# Patient Record
Sex: FEMALE | Race: WHITE | NOT HISPANIC OR LATINO | Employment: PART TIME | URBAN - METROPOLITAN AREA
[De-identification: names, ages, dates, MRNs, and addresses within clinical notes are randomized per-mention and may not be internally consistent; named-entity substitution may affect disease eponyms.]

---

## 2024-05-29 ENCOUNTER — VBI (OUTPATIENT)
Dept: ADMINISTRATIVE | Facility: OTHER | Age: 29
End: 2024-05-29

## 2024-08-25 ENCOUNTER — OFFICE VISIT (OUTPATIENT)
Dept: URGENT CARE | Facility: MEDICAL CENTER | Age: 29
End: 2024-08-25
Payer: COMMERCIAL

## 2024-08-25 VITALS
DIASTOLIC BLOOD PRESSURE: 68 MMHG | WEIGHT: 206 LBS | OXYGEN SATURATION: 99 % | HEART RATE: 80 BPM | TEMPERATURE: 97.4 F | BODY MASS INDEX: 32.26 KG/M2 | RESPIRATION RATE: 20 BRPM | SYSTOLIC BLOOD PRESSURE: 104 MMHG

## 2024-08-25 DIAGNOSIS — L73.1 INGROWN HAIR: Primary | ICD-10-CM

## 2024-08-25 PROCEDURE — 99213 OFFICE O/P EST LOW 20 MIN: CPT | Performed by: FAMILY MEDICINE

## 2024-08-25 PROCEDURE — S9088 SERVICES PROVIDED IN URGENT: HCPCS | Performed by: FAMILY MEDICINE

## 2024-08-25 RX ORDER — CLONIDINE HYDROCHLORIDE 0.1 MG/1
0.1 TABLET ORAL EVERY EVENING
COMMUNITY
Start: 2024-08-15

## 2024-08-25 RX ORDER — SULFAMETHOXAZOLE/TRIMETHOPRIM 800-160 MG
1 TABLET ORAL EVERY 12 HOURS SCHEDULED
Qty: 14 TABLET | Refills: 0 | Status: SHIPPED | OUTPATIENT
Start: 2024-08-25 | End: 2024-09-01

## 2024-08-25 NOTE — PATIENT INSTRUCTIONS
Continue using the black salve.  Monitor for increased area of redness, warmth, and a regular streak moving up the leg.  Ibuprofen for fever and pain.    Follow up with PCP in 3-5 days.  Proceed to  ER if symptoms worsen.    If tests have been performed at [facility], our office will contact you with results if changes need to be made to the care plan discussed with you at the visit.  You can review your full results on [facility] MyChart.

## 2024-11-29 ENCOUNTER — APPOINTMENT (OUTPATIENT)
Dept: RADIOLOGY | Facility: MEDICAL CENTER | Age: 29
End: 2024-11-29
Payer: COMMERCIAL

## 2024-11-29 ENCOUNTER — OFFICE VISIT (OUTPATIENT)
Dept: URGENT CARE | Facility: MEDICAL CENTER | Age: 29
End: 2024-11-29
Payer: COMMERCIAL

## 2024-11-29 VITALS
RESPIRATION RATE: 18 BRPM | TEMPERATURE: 98.1 F | HEART RATE: 121 BPM | OXYGEN SATURATION: 99 % | WEIGHT: 170 LBS | DIASTOLIC BLOOD PRESSURE: 80 MMHG | SYSTOLIC BLOOD PRESSURE: 140 MMHG | BODY MASS INDEX: 26.68 KG/M2 | HEIGHT: 67 IN

## 2024-11-29 DIAGNOSIS — S49.91XA RIGHT SHOULDER INJURY, INITIAL ENCOUNTER: ICD-10-CM

## 2024-11-29 DIAGNOSIS — S46.911A RIGHT SHOULDER STRAIN, INITIAL ENCOUNTER: Primary | ICD-10-CM

## 2024-11-29 PROCEDURE — 99213 OFFICE O/P EST LOW 20 MIN: CPT | Performed by: PHYSICIAN ASSISTANT

## 2024-11-29 PROCEDURE — 73030 X-RAY EXAM OF SHOULDER: CPT

## 2024-11-29 PROCEDURE — S9088 SERVICES PROVIDED IN URGENT: HCPCS | Performed by: PHYSICIAN ASSISTANT

## 2024-11-30 ENCOUNTER — RESULTS FOLLOW-UP (OUTPATIENT)
Dept: URGENT CARE | Facility: MEDICAL CENTER | Age: 29
End: 2024-11-30

## 2024-11-30 NOTE — PATIENT INSTRUCTIONS
Apply ice several time daily  Take Tylenol/Ibuprofen as needed for pain  Gentle range of motion exercises  If symptoms fail to improve follow up with orthopedics    If tests have been performed at [facility], our office will contact you with results if changes need to be made to the care plan discussed with you at the visit.  You can review your full results on [facility] MyChart

## 2024-11-30 NOTE — PROGRESS NOTES
[facility]        NAME: Ainsley Orr is a 29 y.o. female  : 1995    MRN: [id number]  DATE: 2024  TIME: 7:21 PM    Assessment and Plan   Right shoulder strain, initial encounter [S46.911A]  1. Right shoulder strain, initial encounter  XR shoulder 2+ vw right            Patient Instructions     Apply ice several time daily  Take Tylenol/Ibuprofen as needed for pain  Gentle range of motion exercises  If symptoms fail to improve follow up with orthopedics    Follow up with PCP in 3-5 days.  Proceed to  ER if symptoms worsen.    If tests have been performed at [facility], our office will contact you with results if changes need to be made to the care plan discussed with you at the visit.  [facility]    Chief Complaint     Chief Complaint   Patient presents with    Shoulder Pain     Fell down back porch steps a couple of hours ago and hurt right shoulder          History of Present Illness       Shoulder Pain   The pain is present in the right shoulder. This is a new problem. The current episode started today. There has been a history of trauma (fell down steps). The problem occurs constantly. The problem has been gradually worsening. Quality: pulling. The pain is at a severity of 7/10. The pain is moderate. Associated symptoms include a limited range of motion. Pertinent negatives include no joint swelling, numbness or tingling. The symptoms are aggravated by activity. She has tried nothing for the symptoms.       Review of Systems   Review of Systems   Neurological:  Negative for tingling and numbness.         Current Medications       Current Outpatient Medications:     ARIPiprazole (ABILIFY) 15 mg tablet, Take 15 mg by mouth daily, Disp: , Rfl:     cloNIDine (CATAPRES) 0.1 mg tablet, Take 0.1 mg by mouth every evening, Disp: , Rfl:     escitalopram (LEXAPRO) 10 mg tablet, Take 10 mg by mouth daily, Disp: , Rfl:     Current Allergies  "    Allergies as of 11/29/2024    (No Known Allergies)            The following portions of the patient's history were reviewed and updated as appropriate: allergies, current medications, past family history, past medical history, past social history, past surgical history and problem list.     Past Medical History:   Diagnosis Date    Anxiety        Past Surgical History:   Procedure Laterality Date    TOOTH EXTRACTION         History reviewed. No pertinent family history.      Medications have been verified.        Objective   /80   Pulse (!) 121   Temp 98.1 °F (36.7 °C)   Resp 18   Ht 5' 7\" (1.702 m)   Wt 77.1 kg (170 lb)   SpO2 99%   BMI 26.63 kg/m²   No LMP recorded. Patient has had an implant.       Physical Exam     Physical Exam  Vitals and nursing note reviewed.   Constitutional:       Appearance: Normal appearance.   HENT:      Head: Normocephalic and atraumatic.   Cardiovascular:      Rate and Rhythm: Normal rate.   Pulmonary:      Effort: Pulmonary effort is normal.   Musculoskeletal:      Comments: Right shoulder without swelling. Tenderness over mid clavicle and AC join. Diminished ROM   Skin:     General: Skin is warm.   Neurological:      Mental Status: She is alert.     Xray independently reviewed by me contemporaneously as no acute osseous abnormality or acute process. Awaiting radiology interpretation.                "

## 2024-12-02 ENCOUNTER — OFFICE VISIT (OUTPATIENT)
Dept: URGENT CARE | Facility: CLINIC | Age: 29
End: 2024-12-02
Payer: COMMERCIAL

## 2024-12-02 VITALS
SYSTOLIC BLOOD PRESSURE: 118 MMHG | TEMPERATURE: 99.3 F | DIASTOLIC BLOOD PRESSURE: 82 MMHG | OXYGEN SATURATION: 99 % | HEART RATE: 120 BPM | RESPIRATION RATE: 18 BRPM

## 2024-12-02 DIAGNOSIS — H66.002 NON-RECURRENT ACUTE SUPPURATIVE OTITIS MEDIA OF LEFT EAR WITHOUT SPONTANEOUS RUPTURE OF TYMPANIC MEMBRANE: Primary | ICD-10-CM

## 2024-12-02 PROCEDURE — S9088 SERVICES PROVIDED IN URGENT: HCPCS | Performed by: FAMILY MEDICINE

## 2024-12-02 PROCEDURE — 99213 OFFICE O/P EST LOW 20 MIN: CPT | Performed by: FAMILY MEDICINE

## 2024-12-02 RX ORDER — AMOXICILLIN 875 MG/1
875 TABLET, COATED ORAL 2 TIMES DAILY
Qty: 14 TABLET | Refills: 0 | Status: SHIPPED | OUTPATIENT
Start: 2024-12-02 | End: 2024-12-09

## 2024-12-02 NOTE — PROGRESS NOTES
[facility]        NAME: Ainsley Orr is a 29 y.o. female  : 1995    MRN: [id number]  DATE: 2024  TIME: 4:58 PM    Assessment and Plan   Non-recurrent acute suppurative otitis media of left ear without spontaneous rupture of tympanic membrane [H66.002]  1. Non-recurrent acute suppurative otitis media of left ear without spontaneous rupture of tympanic membrane  amoxicillin (AMOXIL) 875 mg tablet        Otitis media of the left ear will treat with amoxicillin.    Patient Instructions   You have been prescribed an antibiotic.  Take antibiotic as directed for the full duration.  Do not stop the antibiotics just because you are feeling better.  Side effects of antibiotics include diarrhea.  Eat yogurt or take a probiotic or acidophilus tablet while taking this medication to help prevent diarrhea and replenish good gut bacteria.    Follow up with PCP in 3-5 days.  Proceed to  ER if symptoms worsen.    Chief Complaint     Chief Complaint   Patient presents with    Earache     Left ear pain started today, feeling sick for about a week with cough and  congestion, sob         History of Present Illness       Patient is a 29 y.o female who presents to the office today for left ear pain. Has been sick URI symptoms for over a week. Now started with pain in the left ear and feverish last night. Cough is now wet sounding. Denies tinnitus.     Earache   Associated symptoms include coughing.       Review of Systems   Review of Systems   Constitutional:  Negative for fever.   HENT:  Positive for congestion and ear pain.    Respiratory:  Positive for cough.    All other systems reviewed and are negative.        Current Medications       Current Outpatient Medications:     amoxicillin (AMOXIL) 875 mg tablet, Take 1 tablet (875 mg total) by mouth 2 (two) times a day for 7 days, Disp: 14 tablet, Rfl: 0    ARIPiprazole (ABILIFY) 15 mg tablet, Take 15 mg by mouth daily, Disp: , Rfl:     cloNIDine (CATAPRES)  0.1 mg tablet, Take 0.1 mg by mouth every evening, Disp: , Rfl:     escitalopram (LEXAPRO) 10 mg tablet, Take 10 mg by mouth daily, Disp: , Rfl:     Current Allergies     Allergies as of 12/02/2024    (No Known Allergies)            The following portions of the patient's history were reviewed and updated as appropriate: allergies, current medications, past family history, past medical history, past social history, past surgical history and problem list.     Past Medical History:   Diagnosis Date    Anxiety        Past Surgical History:   Procedure Laterality Date    TOOTH EXTRACTION         History reviewed. No pertinent family history.      Medications have been verified.        Objective   /82   Pulse (!) 120   Temp 99.3 °F (37.4 °C)   Resp 18   LMP 11/25/2024 (Approximate)   SpO2 99%        Physical Exam     Physical Exam  Vitals and nursing note reviewed.   Constitutional:       General: She is not in acute distress.     Appearance: Normal appearance. She is not ill-appearing or toxic-appearing.   HENT:      Right Ear: A middle ear effusion is present. Tympanic membrane is erythematous and bulging.      Left Ear: A middle ear effusion is present. Tympanic membrane is bulging. Tympanic membrane is not erythematous.      Nose: No congestion.      Mouth/Throat:      Mouth: Mucous membranes are moist.   Cardiovascular:      Rate and Rhythm: Tachycardia present.      Pulses: Normal pulses.      Heart sounds: Normal heart sounds.   Pulmonary:      Effort: Pulmonary effort is normal.      Breath sounds: Normal breath sounds.   Skin:     General: Skin is warm.      Capillary Refill: Capillary refill takes less than 2 seconds.   Neurological:      Mental Status: She is alert.

## 2024-12-02 NOTE — LETTER
December 2, 2024     Patient: Ainsley Orr   YOB: 1995   Date of Visit: 12/2/2024       To Whom It May Concern:    It is my medical opinion that Ainsley Orr may return to work on 12/3/2024 .    If you have any questions or concerns, please don't hesitate to call.         Sincerely,        FRANCES Posey    CC: No Recipients

## 2024-12-27 ENCOUNTER — APPOINTMENT (EMERGENCY)
Dept: RADIOLOGY | Facility: HOSPITAL | Age: 29
End: 2024-12-27
Payer: COMMERCIAL

## 2024-12-27 ENCOUNTER — HOSPITAL ENCOUNTER (EMERGENCY)
Facility: HOSPITAL | Age: 29
Discharge: HOME | End: 2024-12-27
Payer: COMMERCIAL

## 2024-12-27 VITALS
HEART RATE: 56 BPM | OXYGEN SATURATION: 96 % | RESPIRATION RATE: 18 BRPM | WEIGHT: 162 LBS | DIASTOLIC BLOOD PRESSURE: 76 MMHG | BODY MASS INDEX: 25.37 KG/M2 | TEMPERATURE: 98.9 F | SYSTOLIC BLOOD PRESSURE: 110 MMHG

## 2024-12-27 DIAGNOSIS — R68.89 FLU-LIKE SYMPTOMS: Primary | ICD-10-CM

## 2024-12-27 LAB
FLUAV AG UPPER RESP QL IA.RAPID: NEGATIVE
FLUBV AG UPPER RESP QL IA.RAPID: NEGATIVE
SARS-COV+SARS-COV-2 AG RESP QL IA.RAPID: NEGATIVE

## 2024-12-27 PROCEDURE — NC001 PR NO CHARGE

## 2024-12-27 PROCEDURE — 87804 INFLUENZA ASSAY W/OPTIC: CPT

## 2024-12-27 PROCEDURE — 71046 X-RAY EXAM CHEST 2 VIEWS: CPT

## 2024-12-27 PROCEDURE — 87811 SARS-COV-2 COVID19 W/OPTIC: CPT

## 2024-12-27 PROCEDURE — 99284 EMERGENCY DEPT VISIT MOD MDM: CPT

## 2024-12-27 PROCEDURE — 99283 EMERGENCY DEPT VISIT LOW MDM: CPT

## 2024-12-27 RX ORDER — ACETAMINOPHEN 325 MG/1
975 TABLET ORAL ONCE
Status: COMPLETED | OUTPATIENT
Start: 2024-12-27 | End: 2024-12-27

## 2024-12-27 RX ADMIN — ACETAMINOPHEN 975 MG: 325 TABLET, FILM COATED ORAL at 04:30

## 2024-12-27 NOTE — Clinical Note
Ainsley Orr was seen and treated in our emergency department on 12/27/2024.                Diagnosis:     Ainsley  may return to work on return date.    She may return on this date: 12/30/2024         If you have any questions or concerns, please don't hesitate to call.      Pavel Watters MD    ______________________________           _______________          _______________  Hospital Representative                              Date                                Time

## 2024-12-27 NOTE — DISCHARGE INSTRUCTIONS
If you are feeling very short of breath, if you feel like your breathing is very labored, and you have taken Tylenol and ibuprofen without relief, come back for evaluation.  Otherwise anticipate symptoms lasting a few more days before getting better.  The cough itself may last for up to 3 weeks after the other symptoms improved.

## 2024-12-27 NOTE — ED PROVIDER NOTES
Time reflects when diagnosis was documented in both MDM as applicable and the Disposition within this note       Time User Action Codes Description Comment    12/27/2024  5:19 AM Pavel Watters Add [R68.89] Flu-like symptoms           ED Disposition       ED Disposition   Discharge    Condition   Stable    Date/Time   Fri Dec 27, 2024  5:19 AM    Comment   Ainsley Orr discharge to home/self care.                   Assessment & Plan       Medical Decision Making  Medical complexity: 29-year-old female presenting with ongoing cough, body aches, fevers, congestion.  Flulike symptoms that been ongoing for the past several days.  Patient son is now sick with similar symptoms.  Will send viral panel.  Given ongoing cough with productive nature which is worsened over the past few days, will screen chest x-ray for sign of pneumonia, pleural effusion, or pneumothorax.    Reassessment/disposition: Thankfully, patient's chest x-ray was within normal limits without any sign of focal consolidation.  Therefore patient was reassessed and found to have ongoing stable vital signs within normal range without severe symptoms that would warrant further invasive medical testing or workup.  Patient will continue supportive care measures at home, including Tylenol and Motrin as needed for fevers and bodyaches, and p.o. fluids as well as Zofran as needed for nausea or vomiting.  Patient was discharged in no acute distress with normal range vital signs.    Amount and/or Complexity of Data Reviewed  Labs: ordered.  Radiology: ordered and independent interpretation performed.    Risk  OTC drugs.             Medications   acetaminophen (TYLENOL) tablet 975 mg (975 mg Oral Given 12/27/24 0430)       ED Risk Strat Scores                          SBIRT 22yo+      Flowsheet Row Most Recent Value   Initial Alcohol Screen: US AUDIT-C     1. How often do you have a drink containing alcohol? 0 Filed at: 12/27/2024 0338   2. How many drinks  containing alcohol do you have on a typical day you are drinking?  0 Filed at: 12/27/2024 0338   3b. FEMALE Any Age, or MALE 65+: How often do you have 4 or more drinks on one occassion? 0 Filed at: 12/27/2024 0338   Audit-C Score 0 Filed at: 12/27/2024 0338   ANA: How many times in the past year have you...    Used an illegal drug or used a prescription medication for non-medical reasons? Never Filed at: 12/27/2024 0338                            History of Present Illness       Chief Complaint   Patient presents with    Flu Symptoms     Patient reports flu like symptoms since Sunday night, cough runny nose chest congestions generalized aches fever.       Past Medical History:   Diagnosis Date    Anxiety       Past Surgical History:   Procedure Laterality Date    TOOTH EXTRACTION        History reviewed. No pertinent family history.   Social History     Tobacco Use    Smoking status: Former     Current packs/day: 0.25     Types: Cigarettes     Passive exposure: Current   Vaping Use    Vaping status: Never Used   Substance Use Topics    Alcohol use: Never    Drug use: Yes     Types: Marijuana      E-Cigarette/Vaping    E-Cigarette Use Never User       E-Cigarette/Vaping Substances      I have reviewed and agree with the history as documented.     This is a 29-year-old female who is endorsing symptoms of rhinorrhea, chest congestion, cough, generalized body aches, and fevers that have been present since Sunday.  Patient states that her symptoms are actually improving today however have been persistent for the past several days.  Patient is mostly concerned due to the fact that her son is now sick with similar symptoms.  She is denying any significant shortness of breath but is concerned about the productive nature of her cough.  She endorses production of occasional greenish sputum.  She otherwise is denying any chest pain, leg swelling.         Review of Systems   Constitutional:  Positive for appetite change,  chills, fatigue and fever.   HENT:  Positive for congestion. Negative for ear pain and sore throat.    Eyes:  Negative for pain and visual disturbance.   Respiratory:  Positive for cough. Negative for shortness of breath.    Cardiovascular:  Negative for chest pain and palpitations.   Gastrointestinal:  Negative for abdominal pain and vomiting.   Genitourinary:  Negative for dysuria and hematuria.   Musculoskeletal:  Positive for arthralgias and myalgias. Negative for back pain.   Skin:  Negative for color change and rash.   Neurological:  Negative for seizures and syncope.   All other systems reviewed and are negative.          Objective       ED Triage Vitals [12/27/24 0334]   Temperature Pulse Blood Pressure Respirations SpO2 Patient Position - Orthostatic VS   99.2 °F (37.3 °C) (!) 53 114/80 18 95 % --      Temp Source Heart Rate Source BP Location FiO2 (%) Pain Score    Temporal -- -- -- 6      Vitals      Date and Time Temp Pulse SpO2 Resp BP Pain Score FACES Pain Rating User   12/27/24 0537 98.9 °F (37.2 °C) 56 96 % 18 110/76 No Pain --    12/27/24 0430 -- -- -- -- -- Med Not Given for Pain - for MAR use only --    12/27/24 0338 -- -- 95 % -- -- -- --    12/27/24 0334 99.2 °F (37.3 °C) 53 95 % 18 114/80 6 --             Physical Exam  Vitals and nursing note reviewed.   Constitutional:       General: She is not in acute distress.     Appearance: She is well-developed and normal weight.   HENT:      Head: Normocephalic and atraumatic.      Right Ear: External ear normal.      Left Ear: External ear normal.      Nose: Congestion and rhinorrhea present.      Mouth/Throat:      Mouth: Mucous membranes are moist.      Pharynx: Oropharynx is clear. No oropharyngeal exudate or posterior oropharyngeal erythema.   Eyes:      General: No scleral icterus.     Extraocular Movements: Extraocular movements intact.      Conjunctiva/sclera: Conjunctivae normal.      Pupils: Pupils are equal, round, and reactive to  light.   Cardiovascular:      Rate and Rhythm: Normal rate and regular rhythm.      Pulses: Normal pulses.      Heart sounds: Normal heart sounds. No murmur heard.  Pulmonary:      Effort: Pulmonary effort is normal. No respiratory distress.      Breath sounds: Normal breath sounds. No wheezing or rhonchi.   Abdominal:      General: Abdomen is flat. There is no distension.      Palpations: Abdomen is soft.      Tenderness: There is no abdominal tenderness. There is no guarding.   Musculoskeletal:         General: No swelling.      Cervical back: Neck supple. No rigidity.      Right lower leg: No edema.      Left lower leg: No edema.   Lymphadenopathy:      Cervical: No cervical adenopathy.   Skin:     General: Skin is warm and dry.      Capillary Refill: Capillary refill takes less than 2 seconds.      Coloration: Skin is not jaundiced.      Findings: No rash.   Neurological:      General: No focal deficit present.      Mental Status: She is alert and oriented to person, place, and time. Mental status is at baseline.   Psychiatric:         Mood and Affect: Mood normal.         Behavior: Behavior normal.         Results Reviewed       Procedure Component Value Units Date/Time    FLU/COVID Rapid Antigen (30 min. TAT) - Preferred screening test in ED [[id number]]  (Normal) Collected: 12/27/24 0403    Lab Status: Final result Specimen: Nares from Nose Updated: 12/27/24 0439     SARS COV Rapid Antigen Negative     Influenza A Rapid Antigen Negative     Influenza B Rapid Antigen Negative    Narrative:      This test has been performed using the [employer] Alley 2 FLU+SARS Antigen test under the Emergency Use Authorization (EUA). This test has been validated by the  and verified by the performing laboratory. The Alley uses lateral flow immunofluorescent sandwich assay to detect SARS-COV, Influenza A and Influenza B Antigen.     The Quidel Alley 2 SARS Antigen test does not differentiate between SARS-CoV and  SARS-CoV-2.     Negative results are presumptive and may be confirmed with a molecular assay, if necessary, for patient management. Negative results do not rule out SARS-CoV-2 or influenza infection and should not be used as the sole basis for treatment or patient management decisions. A negative test result may occur if the level of antigen in a sample is below the limit of detection of this test.     Positive results are indicative of the presence of viral antigens, but do not rule out bacterial infection or co-infection with other viruses.     All test results should be used as an adjunct to clinical observations and other information available to the provider.    FOR PEDIATRIC PATIENTS - copy/paste COVID Guidelines URL to browser: [email or web address]            XR chest 2 views   ED Interpretation by Pavel Watters MD (12/27 0515)   No acute intrathoracic pathology identified      Final Interpretation by Josiah Levin MD (12/27 0958)      No acute cardiopulmonary disease.      This report is in agreement with the preliminary interpretation.         Workstation performed: [id number]             Procedures    ED Medication and Procedure Management   Prior to Admission Medications   Prescriptions Last Dose Informant Patient Reported? Taking?   ARIPiprazole (ABILIFY) 15 mg tablet   Yes Yes   Sig: Take 15 mg by mouth daily   cloNIDine (CATAPRES) 0.1 mg tablet   Yes Yes   Sig: Take 0.1 mg by mouth every evening   escitalopram (LEXAPRO) 10 mg tablet   Yes Yes   Sig: Take 10 mg by mouth daily   semaglutide, 0.25 or 0.5 mg/dose, (Ozempic) 2 mg/1.5 mL injection pen   Yes Yes   Sig: Inject under the skin every 7 days      Facility-Administered Medications: None     Discharge Medication List as of 12/27/2024  5:20 AM        CONTINUE these medications which have NOT CHANGED    Details   ARIPiprazole (ABILIFY) 15 mg tablet Take 15 mg by mouth daily, Historical  Med      cloNIDine (CATAPRES) 0.1 mg tablet Take 0.1 mg by mouth every evening, Starting Thu 8/15/2024, Historical Med      escitalopram (LEXAPRO) 10 mg tablet Take 10 mg by mouth daily, Starting Wed 6/14/2023, Historical Med      semaglutide, 0.25 or 0.5 mg/dose, (Ozempic) 2 mg/1.5 mL injection pen Inject under the skin every 7 days, Historical Med           No discharge procedures on file.  ED SEPSIS DOCUMENTATION   Time reflects when diagnosis was documented in both MDM as applicable and the Disposition within this note       Time User Action Codes Description Comment    12/27/2024  5:19 AM Pavel Watters Add [R68.89] Flu-like symptoms                  Pavel Watters MD  12/28/24 0965

## 2025-05-14 ENCOUNTER — OFFICE VISIT (OUTPATIENT)
Dept: URGENT CARE | Facility: CLINIC | Age: 30
End: 2025-05-14

## 2025-05-14 VITALS
HEIGHT: 67 IN | OXYGEN SATURATION: 99 % | TEMPERATURE: 98.1 F | DIASTOLIC BLOOD PRESSURE: 90 MMHG | WEIGHT: 175.4 LBS | SYSTOLIC BLOOD PRESSURE: 132 MMHG | HEART RATE: 109 BPM | BODY MASS INDEX: 27.53 KG/M2 | RESPIRATION RATE: 15 BRPM

## 2025-05-14 DIAGNOSIS — L02.818 CUTANEOUS ABSCESS OF OTHER SITE: Primary | ICD-10-CM

## 2025-05-14 PROCEDURE — 99213 OFFICE O/P EST LOW 20 MIN: CPT

## 2025-05-14 RX ORDER — CEPHALEXIN 500 MG/1
500 CAPSULE ORAL EVERY 8 HOURS SCHEDULED
Qty: 21 CAPSULE | Refills: 0 | Status: SHIPPED | OUTPATIENT
Start: 2025-05-14 | End: 2025-05-21

## 2025-05-14 NOTE — PROGRESS NOTES
"  [facility]        NAME: Ainsley Orr is a 29 y.o. female  : 1995    MRN: [id number]  DATE: May 14, 2025  TIME: 2:13 PM    Assessment and Plan   Cutaneous abscess of other site [L02.818]  1. Cutaneous abscess of other site  cephalexin (KEFLEX) 500 mg capsule        Abscess of right axilla. Recommend warm soaks and abx. No shaving and no cosmetics on the area.     Patient Instructions   You have been prescribed an antibiotic.  Take antibiotic as directed for the full duration.  Do not stop the antibiotics just because you are feeling better.  Side effects of antibiotics include diarrhea.  Eat yogurt or take a probiotic or acidophilus tablet while taking this medication to help prevent diarrhea and replenish good gut bacteria.    Follow up with PCP in 3-5 days.  Proceed to  ER if symptoms worsen.    Chief Complaint     Chief Complaint   Patient presents with    Abscess     In right armpit for 3 days         History of Present Illness       Abscess to right axilla for 3 days. Denies fever or chills. Notes pain. Not taking anything for pain. No warm compress.         Review of Systems   Review of Systems   Skin:  Positive for color change and wound.   All other systems reviewed and are negative.        Current Medications     Current Medications[1]    Current Allergies     Allergies as of 2025    (No Known Allergies)            The following portions of the patient's history were reviewed and updated as appropriate: allergies, current medications, past family history, past medical history, past social history, past surgical history and problem list.     Past Medical History:   Diagnosis Date    Anxiety        Past Surgical History:   Procedure Laterality Date    TOOTH EXTRACTION         History reviewed. No pertinent family history.      Medications have been verified.        Objective   /90   Pulse (!) 109   Temp 98.1 °F (36.7 °C)   Resp 15   Ht 5' 7\" (1.702 m)   Wt 79.6 kg (175 lb " 6.4 oz)   LMP 04/01/2025 (Approximate)   SpO2 99%   BMI 27.47 kg/m²        Physical Exam     Physical Exam  Vitals and nursing note reviewed.   Constitutional:       Appearance: Normal appearance. She is normal weight.     Cardiovascular:      Rate and Rhythm: Regular rhythm. Tachycardia present.      Pulses: Normal pulses.      Heart sounds: Normal heart sounds.   Pulmonary:      Effort: Pulmonary effort is normal.      Breath sounds: Normal breath sounds.     Skin:     General: Skin is warm.      Capillary Refill: Capillary refill takes less than 2 seconds.          Neurological:      Mental Status: She is alert.                        [1]   Current Outpatient Medications:     ARIPiprazole (ABILIFY) 15 mg tablet, Take 15 mg by mouth in the morning., Disp: , Rfl:     cephalexin (KEFLEX) 500 mg capsule, Take 1 capsule (500 mg total) by mouth every 8 (eight) hours for 7 days, Disp: 21 capsule, Rfl: 0    cloNIDine (CATAPRES) 0.1 mg tablet, Take 0.1 mg by mouth every evening, Disp: , Rfl:     escitalopram (LEXAPRO) 10 mg tablet, Take 10 mg by mouth in the morning., Disp: , Rfl:     semaglutide, 0.25 or 0.5 mg/dose, (Ozempic) 2 mg/1.5 mL injection pen, Inject under the skin every 7 days, Disp: , Rfl:

## 2025-05-14 NOTE — LETTER
May 14, 2025     Patient: Ainsley Orr   YOB: 1995   Date of Visit: 5/14/2025       To Whom It May Concern:    It is my medical opinion that Ainsley Orr was seen in my clinic on 5/14/2025    If you have any questions or concerns, please don't hesitate to call.         Sincerely,        FRANCES Posey    CC: No Recipients

## 2025-06-13 ENCOUNTER — OFFICE VISIT (OUTPATIENT)
Dept: URGENT CARE | Facility: CLINIC | Age: 30
End: 2025-06-13
Payer: COMMERCIAL

## 2025-06-13 VITALS
SYSTOLIC BLOOD PRESSURE: 109 MMHG | BODY MASS INDEX: 26.84 KG/M2 | TEMPERATURE: 98 F | OXYGEN SATURATION: 98 % | RESPIRATION RATE: 16 BRPM | HEIGHT: 67 IN | DIASTOLIC BLOOD PRESSURE: 72 MMHG | WEIGHT: 171 LBS | HEART RATE: 106 BPM

## 2025-06-13 DIAGNOSIS — L02.411 CUTANEOUS ABSCESS OF RIGHT AXILLA: Primary | ICD-10-CM

## 2025-06-13 PROCEDURE — 99213 OFFICE O/P EST LOW 20 MIN: CPT

## 2025-06-13 PROCEDURE — S9088 SERVICES PROVIDED IN URGENT: HCPCS

## 2025-06-13 RX ORDER — CLINDAMYCIN HYDROCHLORIDE 300 MG/1
300 CAPSULE ORAL 4 TIMES DAILY
Qty: 28 CAPSULE | Refills: 0 | Status: SHIPPED | OUTPATIENT
Start: 2025-06-13 | End: 2025-06-20

## 2025-06-13 NOTE — PROGRESS NOTES
[facility]        NAME: Ainsley Orr is a 29 y.o. female  : 1995    MRN: [id number]  DATE: 2025  TIME: 3:51 PM    Assessment and Plan   Cutaneous abscess of right axilla [L02.411]  1. Cutaneous abscess of right axilla  clindamycin (CLEOCIN) 300 MG capsule        Pt declined attempt to I&D area stating she wishes to try abx and warm compresses first. She was recently on keflex and completed the full course stating improvement of the area but symptoms returned after she shaved her armpits with the same razor she used before her last encounter and treatment. She believes she reinfected herself. Will place on clindamycin and instruct on warm compresses to the area. Motrin and tylenol for pain. If worsening or the area does not show signs of improvement in 3-5 days seek care with the ER for further eval and treatment.     Patient Instructions       Follow up with PCP in 3-5 days.  Proceed to  ER if symptoms worsen.    If tests are performed, our office will contact you with results only if changes need to made to the care plan discussed with you at the visit. [facility]    Chief Complaint     Chief Complaint   Patient presents with    Cyst     Pt states she was seen here a few weeks ago for ingrown hair in right axillary that has been worsening. Pt states she completed her antibiotics she was prescribed that visit. Redness and edema noted.          History of Present Illness       29-year-old female with past medical history significant for anxiety presents to this clinic with complaint of being seen here a few weeks ago for an ingrown hair in her right axilla.  She reports completing the antibiotics as prescribed at her last visit and stating her symptoms went away.  She then shaved her armpits with the same razor she used before being diagnosed with her last infection and reports several days later she started with another lump in her  "armpit.      Review of Systems   Review of Systems   Skin:  Positive for color change and wound.         Current Medications     Current Medications[1]    Current Allergies     Allergies as of 06/13/2025    (No Known Allergies)            The following portions of the patient's history were reviewed and updated as appropriate: allergies, current medications, past family history, past medical history, past social history, past surgical history and problem list.     Past Medical History[2]    Past Surgical History[3]    Family History[4]      Medications have been verified.        Objective   /72   Pulse (!) 106   Temp 98 °F (36.7 °C)   Resp 16   Ht 5' 7\" (1.702 m)   Wt 77.6 kg (171 lb)   LMP 05/01/2025 (Approximate)   SpO2 98%   BMI 26.78 kg/m²        Physical Exam     Physical Exam  Vitals and nursing note reviewed.   Constitutional:       General: She is not in acute distress.     Appearance: Normal appearance. She is normal weight. She is not ill-appearing.   HENT:      Head: Normocephalic and atraumatic.     Cardiovascular:      Rate and Rhythm: Regular rhythm. Tachycardia present.      Pulses: Normal pulses.      Heart sounds: Normal heart sounds.   Pulmonary:      Effort: Pulmonary effort is normal.      Breath sounds: Normal breath sounds.     Musculoskeletal:      Cervical back: Normal range of motion and neck supple.     Skin:     General: Skin is warm.      Capillary Refill: Capillary refill takes less than 2 seconds.      Findings: Erythema present.          Neurological:      General: No focal deficit present.      Mental Status: She is alert and oriented to person, place, and time.     Psychiatric:         Attention and Perception: Attention and perception normal.         Mood and Affect: Mood is anxious.         Speech: Speech is rapid and pressured.         Behavior: Behavior is cooperative.         Thought Content: Thought content normal.         Cognition and Memory: Cognition and " memory normal.         Judgment: Judgment normal.                        [1]   Current Outpatient Medications:     clindamycin (CLEOCIN) 300 MG capsule, Take 1 capsule (300 mg total) by mouth 4 (four) times a day for 7 days, Disp: 28 capsule, Rfl: 0    ARIPiprazole (ABILIFY) 15 mg tablet, Take 15 mg by mouth in the morning., Disp: , Rfl:     cloNIDine (CATAPRES) 0.1 mg tablet, Take 0.1 mg by mouth every evening, Disp: , Rfl:     escitalopram (LEXAPRO) 10 mg tablet, Take 10 mg by mouth in the morning., Disp: , Rfl:     semaglutide, 0.25 or 0.5 mg/dose, (Ozempic) 2 mg/1.5 mL injection pen, Inject under the skin every 7 days, Disp: , Rfl:   [2]   Past Medical History:  Diagnosis Date    Anxiety    [3]   Past Surgical History:  Procedure Laterality Date    TOOTH EXTRACTION     [4] No family history on file.

## 2025-06-13 NOTE — PATIENT INSTRUCTIONS
Monitor the wound for signs of infection including, but not limited to, increased redness, swelling, discharge or warmth or if any redness appears to be moving from the wound in a streak like fashion you will need to be rechecked ASAP.   Take the antibiotics as ordered for the number of days ordered. Even if you begin to feel better or the wound appears to be improving please do not stop your antibiotics. Take the full course.  Eat yogurt with live and active cultures and/or take a probiotic at least 3 hours before or after antibiotic dose. Monitor stool for diarrhea and/or blood. If this occurs, contact primary care doctor ASAP.    Follow up with your primary care provider in about 2-3 days for wound recheck.  REFRAIN from using deodorant or shaving the area until the symptoms resolve   Warm moist compress to the right axillary area up to 4-6 times per day for 15 minutes each application